# Patient Record
Sex: MALE | Race: WHITE
[De-identification: names, ages, dates, MRNs, and addresses within clinical notes are randomized per-mention and may not be internally consistent; named-entity substitution may affect disease eponyms.]

---

## 2017-01-04 NOTE — REP
MRI BRAIN WITHOUT AND WITH CONTRAST:

 

HISTORY: Bilateral hearing loss.

 

CONTRAST: ProHance 17 mL.

 

Several punctate areas of increased signal intensity on T2-weighted images are

present in the periventricular and subcortical white matter. This represents

small vessel ischemic disease. There is no intraparenchymal hemorrhage, infarct,

mass or midline shift. There is no abnormal enhancement. The ventricular system

is normal in appearance. There is no extracerebral collection. There is no

cerebellopontine angle mass. The inner ear structures are normal in appearance.

The mastoid air cells are clear. Minimal mucosal thickening is present in the

right maxillary sinus.

 

IMPRESSION:

 

Minimal small vessel ischemic disease.

 

 

Signed by

Raheem Wheeler MD 01/05/2017 08:45 A

## 2020-06-30 ENCOUNTER — HOSPITAL ENCOUNTER (OUTPATIENT)
Dept: HOSPITAL 53 - M PLALAB | Age: 65
End: 2020-06-30
Attending: NURSE PRACTITIONER
Payer: MEDICARE

## 2020-06-30 DIAGNOSIS — Z12.5: ICD-10-CM

## 2020-06-30 DIAGNOSIS — R33.9: Primary | ICD-10-CM

## 2020-06-30 LAB
APPEARANCE UR: CLEAR
BACTERIA UR QL AUTO: NEGATIVE
BILIRUB UR QL STRIP.AUTO: NEGATIVE
BUN SERPL-MCNC: 11 MG/DL (ref 7–18)
CALCIUM SERPL-MCNC: 9.7 MG/DL (ref 8.8–10.2)
CHLORIDE SERPL-SCNC: 105 MEQ/L (ref 98–107)
CO2 SERPL-SCNC: 30 MEQ/L (ref 21–32)
CREAT SERPL-MCNC: 1.02 MG/DL (ref 0.7–1.3)
GFR SERPL CREATININE-BSD FRML MDRD: > 60 ML/MIN/{1.73_M2} (ref 49–?)
GLUCOSE SERPL-MCNC: 90 MG/DL (ref 70–100)
GLUCOSE UR QL STRIP.AUTO: NEGATIVE MG/DL
HGB UR QL STRIP.AUTO: NEGATIVE
KETONES UR QL STRIP.AUTO: NEGATIVE MG/DL
LEUKOCYTE ESTERASE UR QL STRIP.AUTO: NEGATIVE
NITRITE UR QL STRIP.AUTO: NEGATIVE
PH UR STRIP.AUTO: 5 UNITS (ref 5–9)
POTASSIUM SERPL-SCNC: 4.3 MEQ/L (ref 3.5–5.1)
PROT UR QL STRIP.AUTO: NEGATIVE MG/DL
RBC # UR AUTO: 1 /HPF (ref 0–3)
SODIUM SERPL-SCNC: 138 MEQ/L (ref 136–145)
SP GR UR STRIP.AUTO: 1.01 (ref 1–1.03)
SQUAMOUS #/AREA URNS AUTO: 1 /HPF (ref 0–6)
UROBILINOGEN UR QL STRIP.AUTO: 0.2 MG/DL (ref 0–2)
WBC #/AREA URNS AUTO: 7 /HPF (ref 0–3)

## 2020-06-30 PROCEDURE — 80048 BASIC METABOLIC PNL TOTAL CA: CPT

## 2020-06-30 PROCEDURE — 36415 COLL VENOUS BLD VENIPUNCTURE: CPT

## 2020-06-30 PROCEDURE — 87086 URINE CULTURE/COLONY COUNT: CPT

## 2020-06-30 PROCEDURE — 81001 URINALYSIS AUTO W/SCOPE: CPT

## 2020-07-02 ENCOUNTER — HOSPITAL ENCOUNTER (OUTPATIENT)
Dept: HOSPITAL 53 - M LRY | Age: 65
End: 2020-07-02
Attending: NURSE PRACTITIONER
Payer: MEDICARE

## 2020-07-02 DIAGNOSIS — N40.1: Primary | ICD-10-CM

## 2020-07-02 NOTE — REP
Clinical:  Urinary retention.

 

Technique:  Real time gray scale ultrasound examination using curved array

transducer.

 

Findings:

The kidneys are normal in reniform shape and echogenicity without hydronephrosis,

cystic or renal mass lesion.  Right kidney measures 12.6 x 5.7 x 5.6 cm and

includes suspected 10 mm nonobstructing midpole calculus.  Left kidney measures

12.3 x 4.6 x 4.7 cm.

 

Impression:

10 mm nonobstructing right renal calculus.

No hydronephrosis.

 

 

Electronically Signed by

Darron Burleson MD 07/02/2020 04:26 P

## 2020-07-02 NOTE — REP
Clinical:  Urinary retention.

 

Technique:  Real time gray scale ultrasound examination using curved array

transducer.

 

Findings:

The bladder demonstrates wall thickening to approximately 7 mm and bilateral

ureteral jets are noted.  There is a large posterior bladder diverticulum.  The

prevoid urinary volume measures approximately 490 ml.  The postvoid urinary

volume measures approximately 190 ml.  Postvoid residual volume equals

approximately 38%.

 

Heterogeneous prostate gland measures 3.5 x 3.1 x 5.6 cm (32 ml).

 

Impression:

Significant residual postvoid volume refluxing into a moderately large posterior

bladder diverticulum.

 

 

Electronically Signed by

Darorn Burleson MD 07/02/2020 04:30 P

## 2022-01-25 ENCOUNTER — HOSPITAL ENCOUNTER (OUTPATIENT)
Dept: HOSPITAL 53 - M SMT | Age: 67
End: 2022-01-25
Attending: UROLOGY
Payer: MEDICARE

## 2022-01-25 DIAGNOSIS — R33.9: Primary | ICD-10-CM

## 2022-01-25 LAB
APPEARANCE UR: CLEAR
BACTERIA UR QL AUTO: (no result)
BILIRUB UR QL STRIP.AUTO: NEGATIVE
GLUCOSE UR QL STRIP.AUTO: NEGATIVE MG/DL
HGB UR QL STRIP.AUTO: NEGATIVE
KETONES UR QL STRIP.AUTO: NEGATIVE MG/DL
LEUKOCYTE ESTERASE UR QL STRIP.AUTO: NEGATIVE
NITRITE UR QL STRIP.AUTO: NEGATIVE
PH UR STRIP.AUTO: 5 UNITS (ref 5–9)
PROT UR QL STRIP.AUTO: NEGATIVE MG/DL
RBC # UR AUTO: 1 /HPF (ref 0–3)
SP GR UR STRIP.AUTO: 1.01 (ref 1–1.03)
SQUAMOUS #/AREA URNS AUTO: 0 /HPF (ref 0–6)
UROBILINOGEN UR QL STRIP.AUTO: 0.2 MG/DL (ref 0–2)
WBC #/AREA URNS AUTO: 6 /HPF (ref 0–3)

## 2022-02-02 ENCOUNTER — HOSPITAL ENCOUNTER (OUTPATIENT)
Dept: HOSPITAL 53 - M SDC | Age: 67
Discharge: HOME | End: 2022-02-02
Attending: UROLOGY
Payer: MEDICARE

## 2022-02-02 VITALS — SYSTOLIC BLOOD PRESSURE: 136 MMHG | DIASTOLIC BLOOD PRESSURE: 74 MMHG

## 2022-02-02 VITALS — BODY MASS INDEX: 27.03 KG/M2 | WEIGHT: 188.8 LBS | HEIGHT: 70 IN

## 2022-02-02 DIAGNOSIS — N35.919: Primary | ICD-10-CM

## 2022-02-02 DIAGNOSIS — R33.9: ICD-10-CM

## 2022-02-02 DIAGNOSIS — K50.90: ICD-10-CM

## 2022-02-02 DIAGNOSIS — K58.9: ICD-10-CM

## 2022-02-02 DIAGNOSIS — G47.30: ICD-10-CM

## 2022-02-02 DIAGNOSIS — K21.9: ICD-10-CM

## 2022-02-02 DIAGNOSIS — Z79.899: ICD-10-CM

## 2022-02-02 PROCEDURE — 52276 CYSTOSCOPY AND TREATMENT: CPT

## 2023-04-29 ENCOUNTER — HOSPITAL ENCOUNTER (OUTPATIENT)
Dept: HOSPITAL 53 - M LAB REF | Age: 68
End: 2023-04-29
Attending: PHYSICIAN ASSISTANT
Payer: MEDICARE

## 2023-04-29 DIAGNOSIS — J02.9: Primary | ICD-10-CM
